# Patient Record
Sex: MALE | Race: WHITE | NOT HISPANIC OR LATINO | URBAN - METROPOLITAN AREA
[De-identification: names, ages, dates, MRNs, and addresses within clinical notes are randomized per-mention and may not be internally consistent; named-entity substitution may affect disease eponyms.]

---

## 2017-01-16 ENCOUNTER — OUTPATIENT (OUTPATIENT)
Dept: OUTPATIENT SERVICES | Facility: HOSPITAL | Age: 62
LOS: 1 days | End: 2017-01-16
Payer: COMMERCIAL

## 2017-01-16 DIAGNOSIS — I72.8 ANEURYSM OF OTHER SPECIFIED ARTERIES: Chronic | ICD-10-CM

## 2017-01-16 PROCEDURE — 71250 CT THORAX DX C-: CPT

## 2017-01-16 PROCEDURE — 71250 CT THORAX DX C-: CPT | Mod: 26

## 2017-01-16 PROCEDURE — 76376 3D RENDER W/INTRP POSTPROCES: CPT | Mod: 26

## 2017-01-19 ENCOUNTER — OUTPATIENT (OUTPATIENT)
Dept: OUTPATIENT SERVICES | Facility: HOSPITAL | Age: 62
LOS: 1 days | End: 2017-01-19
Payer: COMMERCIAL

## 2017-01-19 DIAGNOSIS — Z01.818 ENCOUNTER FOR OTHER PREPROCEDURAL EXAMINATION: ICD-10-CM

## 2017-01-19 DIAGNOSIS — I72.8 ANEURYSM OF OTHER SPECIFIED ARTERIES: Chronic | ICD-10-CM

## 2017-01-19 LAB
ALBUMIN SERPL ELPH-MCNC: 4 G/DL — SIGNIFICANT CHANGE UP (ref 3.4–5)
ALP SERPL-CCNC: 71 U/L — SIGNIFICANT CHANGE UP (ref 40–120)
ALT FLD-CCNC: 25 U/L — SIGNIFICANT CHANGE UP (ref 12–42)
ANION GAP SERPL CALC-SCNC: 9 MMOL/L — SIGNIFICANT CHANGE UP (ref 9–16)
APTT BLD: 33.2 SEC — SIGNIFICANT CHANGE UP (ref 27.5–37.4)
AST SERPL-CCNC: 17 U/L — SIGNIFICANT CHANGE UP (ref 15–37)
BILIRUB SERPL-MCNC: 0.7 MG/DL — SIGNIFICANT CHANGE UP (ref 0.2–1.2)
BUN SERPL-MCNC: 21 MG/DL — SIGNIFICANT CHANGE UP (ref 7–23)
CALCIUM SERPL-MCNC: 8.4 MG/DL — LOW (ref 8.5–10.5)
CHLORIDE SERPL-SCNC: 104 MMOL/L — SIGNIFICANT CHANGE UP (ref 96–108)
CO2 SERPL-SCNC: 29 MMOL/L — SIGNIFICANT CHANGE UP (ref 22–31)
CREAT SERPL-MCNC: 0.86 MG/DL — SIGNIFICANT CHANGE UP (ref 0.5–1.3)
GLUCOSE SERPL-MCNC: 83 MG/DL — SIGNIFICANT CHANGE UP (ref 70–99)
HCT VFR BLD CALC: 41.7 % — SIGNIFICANT CHANGE UP (ref 39–50)
HGB BLD-MCNC: 14.4 G/DL — SIGNIFICANT CHANGE UP (ref 13–17)
INR BLD: 1.06 — SIGNIFICANT CHANGE UP (ref 0.88–1.16)
MCHC RBC-ENTMCNC: 32 PG — SIGNIFICANT CHANGE UP (ref 27–34)
MCHC RBC-ENTMCNC: 34.5 G/DL — SIGNIFICANT CHANGE UP (ref 32–36)
MCV RBC AUTO: 92.7 FL — SIGNIFICANT CHANGE UP (ref 80–100)
PLATELET # BLD AUTO: 198 K/UL — SIGNIFICANT CHANGE UP (ref 150–400)
POTASSIUM SERPL-MCNC: 4.2 MMOL/L — SIGNIFICANT CHANGE UP (ref 3.5–5.3)
POTASSIUM SERPL-SCNC: 4.2 MMOL/L — SIGNIFICANT CHANGE UP (ref 3.5–5.3)
PROT SERPL-MCNC: 7.1 G/DL — SIGNIFICANT CHANGE UP (ref 6.4–8.2)
PROTHROM AB SERPL-ACNC: 11.8 SEC — SIGNIFICANT CHANGE UP (ref 10–13.1)
RBC # BLD: 4.5 M/UL — SIGNIFICANT CHANGE UP (ref 4.2–5.8)
RBC # FLD: 12.1 % — SIGNIFICANT CHANGE UP (ref 10.3–16.9)
SODIUM SERPL-SCNC: 142 MMOL/L — SIGNIFICANT CHANGE UP (ref 135–145)
WBC # BLD: 3.8 K/UL — SIGNIFICANT CHANGE UP (ref 3.8–10.5)
WBC # FLD AUTO: 3.8 K/UL — SIGNIFICANT CHANGE UP (ref 3.8–10.5)

## 2017-01-19 PROCEDURE — G0103: CPT

## 2017-01-19 PROCEDURE — 85730 THROMBOPLASTIN TIME PARTIAL: CPT

## 2017-01-19 PROCEDURE — 93010 ELECTROCARDIOGRAM REPORT: CPT

## 2017-01-19 PROCEDURE — 93005 ELECTROCARDIOGRAM TRACING: CPT

## 2017-01-19 PROCEDURE — 85610 PROTHROMBIN TIME: CPT

## 2017-01-19 PROCEDURE — 85027 COMPLETE CBC AUTOMATED: CPT

## 2017-01-19 PROCEDURE — 80053 COMPREHEN METABOLIC PANEL: CPT

## 2017-01-20 LAB — PSA FLD-MCNC: 4.04 NG/ML — HIGH (ref 0–4)

## 2017-01-24 ENCOUNTER — RESULT REVIEW (OUTPATIENT)
Age: 62
End: 2017-01-24

## 2017-01-25 ENCOUNTER — OUTPATIENT (OUTPATIENT)
Dept: OUTPATIENT SERVICES | Facility: HOSPITAL | Age: 62
LOS: 1 days | Discharge: HOME | End: 2017-01-25
Payer: COMMERCIAL

## 2017-01-25 DIAGNOSIS — I72.8 ANEURYSM OF OTHER SPECIFIED ARTERIES: Chronic | ICD-10-CM

## 2017-01-25 PROCEDURE — 87305 ASPERGILLUS AG IA: CPT

## 2017-01-25 PROCEDURE — 87206 SMEAR FLUORESCENT/ACID STAI: CPT

## 2017-01-25 PROCEDURE — 87116 MYCOBACTERIA CULTURE: CPT

## 2017-01-25 PROCEDURE — 88112 CYTOPATH CELL ENHANCE TECH: CPT

## 2017-01-25 PROCEDURE — 87070 CULTURE OTHR SPECIMN AEROBIC: CPT

## 2017-01-25 PROCEDURE — 31624 DX BRONCHOSCOPE/LAVAGE: CPT | Mod: RT

## 2017-01-25 PROCEDURE — 87102 FUNGUS ISOLATION CULTURE: CPT

## 2017-01-25 PROCEDURE — 87015 SPECIMEN INFECT AGNT CONCNTJ: CPT

## 2017-01-25 PROCEDURE — 88305 TISSUE EXAM BY PATHOLOGIST: CPT

## 2017-01-26 LAB
GRAM STN FLD: SIGNIFICANT CHANGE UP
NIGHT BLUE STAIN TISS: SIGNIFICANT CHANGE UP
SPECIMEN SOURCE: SIGNIFICANT CHANGE UP
SPECIMEN SOURCE: SIGNIFICANT CHANGE UP

## 2017-01-27 LAB
GALACTOMANNAN AG SPEC IA-ACNC: <0.5 INDEX — SIGNIFICANT CHANGE UP
NON-GYNECOLOGICAL CYTOLOGY STUDY: SIGNIFICANT CHANGE UP

## 2017-01-28 DIAGNOSIS — J21.9 ACUTE BRONCHIOLITIS, UNSPECIFIED: ICD-10-CM

## 2017-01-28 DIAGNOSIS — J47.9 BRONCHIECTASIS, UNCOMPLICATED: ICD-10-CM

## 2017-01-28 DIAGNOSIS — N40.0 BENIGN PROSTATIC HYPERPLASIA WITHOUT LOWER URINARY TRACT SYMPTOMS: ICD-10-CM

## 2017-01-28 LAB
CULTURE RESULTS: SIGNIFICANT CHANGE UP
SPECIMEN SOURCE: SIGNIFICANT CHANGE UP

## 2017-02-25 LAB
CULTURE RESULTS: SIGNIFICANT CHANGE UP
SPECIMEN SOURCE: SIGNIFICANT CHANGE UP

## 2017-03-11 LAB
CULTURE RESULTS: SIGNIFICANT CHANGE UP
SPECIMEN SOURCE: SIGNIFICANT CHANGE UP

## 2017-04-03 ENCOUNTER — APPOINTMENT (OUTPATIENT)
Dept: VASCULAR SURGERY | Facility: CLINIC | Age: 62
End: 2017-04-03

## 2017-04-03 VITALS
BODY MASS INDEX: 24.49 KG/M2 | OXYGEN SATURATION: 97 % | SYSTOLIC BLOOD PRESSURE: 128 MMHG | DIASTOLIC BLOOD PRESSURE: 86 MMHG | HEIGHT: 65 IN | WEIGHT: 147 LBS | HEART RATE: 78 BPM

## 2017-04-24 ENCOUNTER — APPOINTMENT (OUTPATIENT)
Dept: VASCULAR SURGERY | Facility: CLINIC | Age: 62
End: 2017-04-24

## 2017-04-28 ENCOUNTER — APPOINTMENT (OUTPATIENT)
Dept: VASCULAR SURGERY | Facility: CLINIC | Age: 62
End: 2017-04-28

## 2017-05-12 ENCOUNTER — APPOINTMENT (OUTPATIENT)
Dept: VASCULAR SURGERY | Facility: CLINIC | Age: 62
End: 2017-05-12

## 2017-08-23 ENCOUNTER — LABORATORY RESULT (OUTPATIENT)
Age: 62
End: 2017-08-23

## 2017-08-23 ENCOUNTER — APPOINTMENT (OUTPATIENT)
Dept: RHEUMATOLOGY | Facility: CLINIC | Age: 62
End: 2017-08-23
Payer: COMMERCIAL

## 2017-08-23 VITALS
RESPIRATION RATE: 17 BRPM | HEIGHT: 65 IN | BODY MASS INDEX: 24.49 KG/M2 | HEART RATE: 75 BPM | SYSTOLIC BLOOD PRESSURE: 117 MMHG | OXYGEN SATURATION: 98 % | TEMPERATURE: 98.1 F | DIASTOLIC BLOOD PRESSURE: 78 MMHG | WEIGHT: 147 LBS

## 2017-08-23 DIAGNOSIS — M79.606 PAIN IN LEG, UNSPECIFIED: ICD-10-CM

## 2017-08-23 DIAGNOSIS — I83.93 ASYMPTOMATIC VARICOSE VEINS OF BILATERAL LOWER EXTREMITIES: ICD-10-CM

## 2017-08-23 PROCEDURE — 99214 OFFICE O/P EST MOD 30 MIN: CPT | Mod: 25

## 2017-08-23 PROCEDURE — 36415 COLL VENOUS BLD VENIPUNCTURE: CPT

## 2017-08-24 LAB
ALBUMIN SERPL ELPH-MCNC: 4.6 G/DL
ALP BLD-CCNC: 58 U/L
ALT SERPL-CCNC: 17 U/L
ANION GAP SERPL CALC-SCNC: 17 MMOL/L
AST SERPL-CCNC: 28 U/L
BASOPHILS # BLD AUTO: 0.01 K/UL
BASOPHILS NFR BLD AUTO: 0.2 %
BILIRUB SERPL-MCNC: 1.1 MG/DL
BUN SERPL-MCNC: 24 MG/DL
C3 SERPL-MCNC: 97 MG/DL
C4 SERPL-MCNC: 21 MG/DL
CALCIUM SERPL-MCNC: 9.7 MG/DL
CHLORIDE SERPL-SCNC: 99 MMOL/L
CHOLEST SERPL-MCNC: 231 MG/DL
CHOLEST/HDLC SERPL: 3 RATIO
CK SERPL-CCNC: 89 U/L
CO2 SERPL-SCNC: 26 MMOL/L
CREAT SERPL-MCNC: 0.92 MG/DL
CRP SERPL-MCNC: <0.2 MG/DL
EOSINOPHIL # BLD AUTO: 0.18 K/UL
EOSINOPHIL NFR BLD AUTO: 4.3 %
ERYTHROCYTE [SEDIMENTATION RATE] IN BLOOD BY WESTERGREN METHOD: 12 MM/HR
GLUCOSE SERPL-MCNC: 87 MG/DL
HCT VFR BLD CALC: 44.6 %
HDLC SERPL-MCNC: 77 MG/DL
HGB BLD-MCNC: 14.4 G/DL
IMM GRANULOCYTES NFR BLD AUTO: 0 %
IRON SATN MFR SERPL: 39 %
IRON SERPL-MCNC: 135 UG/DL
LDLC SERPL CALC-MCNC: 138 MG/DL
LYMPHOCYTES # BLD AUTO: 1.8 K/UL
LYMPHOCYTES NFR BLD AUTO: 42.9 %
MAN DIFF?: NORMAL
MCHC RBC-ENTMCNC: 31.2 PG
MCHC RBC-ENTMCNC: 32.3 GM/DL
MCV RBC AUTO: 96.5 FL
MONOCYTES # BLD AUTO: 0.36 K/UL
MONOCYTES NFR BLD AUTO: 8.6 %
NEUTROPHILS # BLD AUTO: 1.85 K/UL
NEUTROPHILS NFR BLD AUTO: 44 %
PLATELET # BLD AUTO: 237 K/UL
POTASSIUM SERPL-SCNC: 4.7 MMOL/L
PROT SERPL-MCNC: 7.7 G/DL
RBC # BLD: 4.62 M/UL
RBC # FLD: 13.3 %
RHEUMATOID FACT SER QL: <7 IU/ML
SODIUM SERPL-SCNC: 142 MMOL/L
T3FREE SERPL-MCNC: 2.82 PG/ML
T4 FREE SERPL-MCNC: 1.2 NG/DL
T4 SERPL-MCNC: 6.7 UG/DL
TIBC SERPL-MCNC: 344 UG/DL
TRIGL SERPL-MCNC: 80 MG/DL
TSH SERPL-ACNC: 1.7 UIU/ML
UIBC SERPL-MCNC: 209 UG/DL
URATE SERPL-MCNC: 4.4 MG/DL
WBC # FLD AUTO: 4.2 K/UL

## 2017-08-29 LAB
ANA SER IF-ACNC: NEGATIVE
CCP AB SER IA-ACNC: <8 UNITS
CH50 SERPL-MCNC: 58 U/ML
IGG SUBSET TOTAL IGG: 1030 MG/DL
IGG1 SER-MCNC: 481 MG/DL
IGG2 SER-MCNC: 446 MG/DL
IGG3 SER-MCNC: 85.3 MG/DL
IGG4 SER-MCNC: 82 MG/DL
RF+CCP IGG SER-IMP: NEGATIVE

## 2017-10-18 ENCOUNTER — APPOINTMENT (OUTPATIENT)
Dept: RHEUMATOLOGY | Facility: CLINIC | Age: 62
End: 2017-10-18
Payer: COMMERCIAL

## 2017-10-18 VITALS
HEART RATE: 70 BPM | DIASTOLIC BLOOD PRESSURE: 79 MMHG | RESPIRATION RATE: 17 BRPM | BODY MASS INDEX: 24.16 KG/M2 | HEIGHT: 65 IN | TEMPERATURE: 98.3 F | OXYGEN SATURATION: 98 % | SYSTOLIC BLOOD PRESSURE: 118 MMHG | WEIGHT: 145 LBS

## 2017-10-18 DIAGNOSIS — M25.50 PAIN IN UNSPECIFIED JOINT: ICD-10-CM

## 2017-10-18 PROCEDURE — 99214 OFFICE O/P EST MOD 30 MIN: CPT

## 2018-05-17 ENCOUNTER — OUTPATIENT (OUTPATIENT)
Dept: OUTPATIENT SERVICES | Facility: HOSPITAL | Age: 63
LOS: 1 days | Discharge: HOME | End: 2018-05-17

## 2018-05-17 ENCOUNTER — RESULT REVIEW (OUTPATIENT)
Age: 63
End: 2018-05-17

## 2018-05-17 DIAGNOSIS — I72.8 ANEURYSM OF OTHER SPECIFIED ARTERIES: Chronic | ICD-10-CM

## 2018-05-23 LAB — SURGICAL PATHOLOGY STUDY: SIGNIFICANT CHANGE UP

## 2018-11-15 ENCOUNTER — EMERGENCY (EMERGENCY)
Facility: HOSPITAL | Age: 63
LOS: 1 days | Discharge: HOME | End: 2018-11-15
Admitting: EMERGENCY MEDICINE
Payer: COMMERCIAL

## 2018-11-15 VITALS
SYSTOLIC BLOOD PRESSURE: 121 MMHG | RESPIRATION RATE: 17 BRPM | HEART RATE: 82 BPM | TEMPERATURE: 98 F | DIASTOLIC BLOOD PRESSURE: 81 MMHG | OXYGEN SATURATION: 97 %

## 2018-11-15 VITALS
RESPIRATION RATE: 18 BRPM | DIASTOLIC BLOOD PRESSURE: 79 MMHG | OXYGEN SATURATION: 100 % | TEMPERATURE: 98 F | HEART RATE: 57 BPM | SYSTOLIC BLOOD PRESSURE: 122 MMHG
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
## 2018-11-15 DIAGNOSIS — Z79.1 LONG TERM (CURRENT) USE OF NON-STEROIDAL ANTI-INFLAMMATORIES (NSAID): ICD-10-CM

## 2018-11-15 DIAGNOSIS — Z79.82 LONG TERM (CURRENT) USE OF ASPIRIN: ICD-10-CM

## 2018-11-15 DIAGNOSIS — I72.8 ANEURYSM OF OTHER SPECIFIED ARTERIES: Chronic | ICD-10-CM
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
## 2018-11-15 DIAGNOSIS — S39.012A STRAIN OF MUSCLE, FASCIA AND TENDON OF LOWER BACK, INITIAL ENCOUNTER: ICD-10-CM

## 2018-11-15 DIAGNOSIS — Z79.899 OTHER LONG TERM (CURRENT) DRUG THERAPY: ICD-10-CM

## 2018-11-15 DIAGNOSIS — M54.5 LOW BACK PAIN: ICD-10-CM

## 2018-11-15 PROCEDURE — 96372 THER/PROPH/DIAG INJ SC/IM: CPT

## 2018-11-15 PROCEDURE — 99283 EMERGENCY DEPT VISIT LOW MDM: CPT | Mod: 25

## 2018-11-15 RX ORDER — KETOROLAC TROMETHAMINE 30 MG/ML
30 SYRINGE (ML) INJECTION ONCE
Qty: 0 | Refills: 0 | Status: DISCONTINUED | OUTPATIENT
Start: 2018-11-15 | End: 2018-11-15

## 2018-11-15 RX ORDER — METHOCARBAMOL 500 MG/1
1 TABLET, FILM COATED ORAL
Qty: 10 | Refills: 0 | OUTPATIENT
Start: 2018-11-15 | End: 2018-11-19

## 2018-11-15 RX ADMIN — Medication 30 MILLIGRAM(S): at 09:08

## 2018-11-15 NOTE — ED PROVIDER NOTE - MEDICAL DECISION MAKING DETAILS
Patient with reproducible back pain on exam. Recommend rest, ice, heat therapy and NSAIDs PRN. F/u with pmd.

## 2018-11-15 NOTE — ED ADULT TRIAGE NOTE - CHIEF COMPLAINT QUOTE
Pt states "I hurt my back this am lifting a garden pot filled with soil. My lower left side hurts." Numbness/tingling in legs. Pt able to ambulate without difficulty

## 2018-11-15 NOTE — ED ADULT NURSE NOTE - OBJECTIVE STATEMENT
Pt presents complaining of left sided lower back pain that started today after lifting a package of soil at 3am today. Pt states "The pain is on my left side. I took Advil at 4am but I still have the pain, especially when I walk". Pt denies fevers, no lightheadedness, no dizziness, no headache, no cp, no sob, no n/v, no changes to bowels, no urinary complaints, no bowel or bladder incontinence, no numbness or tingling. +PP +CSM.

## 2018-11-15 NOTE — ED ADULT NURSE NOTE - CHPI ED NUR SYMPTOMS NEG
no numbness/no motor function loss/no bladder dysfunction/no constipation/no neck tenderness/no tingling/no bowel dysfunction/no difficulty bearing weight/no anorexia/no fatigue

## 2018-11-15 NOTE — ED PROVIDER NOTE - PMH
Abnormal CT scan of lung  CT Chest 5/15/15 @[facility]: Mild small and large airways disease which may be secondary to nontuberculous mycobacterial infection in the appropriate clinical setting. 2.  Trace pericardial effusion.  Artery aneurysm, upper extremity  unknown type of residual disease of Left upper extremity artery (pt stated not repairable)  BPH (benign prostatic hypertrophy)    Cholelithiasis    Vertigo

## 2018-11-15 NOTE — ED ADULT NURSE NOTE - FAMILY HISTORY
Father  Still living? No  Family history of acute myocardial infarction, Age at diagnosis: 71-80     Mother  Still living? Yes, Estimated age: 81-90  Family history of hypertension in mother, Age at diagnosis: Age Unknown

## 2018-11-15 NOTE — ED ADULT NURSE NOTE - PMH
Abnormal CT scan of lung  CT Chest 5/15/15 @[facility]: Mild small and large airways disease which may be secondary to nontuberculous mycobacterial infection in the appropriate clinical setting. 2.  Trace pericardial effusion.  Artery aneurysm, upper extremity  unknown type of residual disease of Left upper extremity artery (pt stated not repairable)  BPH (benign prostatic hypertrophy)    Cholelithiasis    Vertigo

## 2018-11-15 NOTE — ED PROVIDER NOTE - OBJECTIVE STATEMENT
64 y/o m with h/o vertigo, BPH, presents to ED c/o lower back pain after lifting a heavy plant this morning. He report pain worsen with movement and better at rest. No h/o chronic back pain, paresis, paresthesia, abd pain, n, v, fever, hematuria. No incontinence.

## 2018-11-15 NOTE — ED PROVIDER NOTE - CHPI ED SYMPTOMS NEG
no numbness/no motor function loss/no difficulty bearing weight/no tingling/no bowel dysfunction/no bladder dysfunction

## 2018-11-15 NOTE — ED PROVIDER NOTE - PELVIS
+ reproducible pain at paraspinal lumbar tenderness, no midline pain, no paresis, paresthesia, ambulating well./stable

## 2019-03-04 ENCOUNTER — APPOINTMENT (OUTPATIENT)
Dept: SURGERY | Facility: CLINIC | Age: 64
End: 2019-03-04
Payer: COMMERCIAL

## 2019-03-04 VITALS
BODY MASS INDEX: 25.62 KG/M2 | WEIGHT: 153.8 LBS | HEIGHT: 65 IN | SYSTOLIC BLOOD PRESSURE: 115 MMHG | DIASTOLIC BLOOD PRESSURE: 74 MMHG | OXYGEN SATURATION: 98 % | HEART RATE: 74 BPM

## 2019-03-04 PROCEDURE — 99244 OFF/OP CNSLTJ NEW/EST MOD 40: CPT

## 2019-04-01 ENCOUNTER — APPOINTMENT (OUTPATIENT)
Dept: VASCULAR SURGERY | Facility: CLINIC | Age: 64
End: 2019-04-01
Payer: COMMERCIAL

## 2019-04-01 DIAGNOSIS — I83.10 VARICOSE VEINS OF UNSPECIFIED LOWER EXTREMITY WITH INFLAMMATION: ICD-10-CM

## 2019-04-01 PROCEDURE — 93971 EXTREMITY STUDY: CPT

## 2019-04-01 PROCEDURE — 99214 OFFICE O/P EST MOD 30 MIN: CPT

## 2019-04-01 NOTE — HISTORY OF PRESENT ILLNESS
[FreeTextEntry1] : 62 yo M, known to the practice who has a long standing history of varicose veins, s/p L GSV RFA in 2013 followed by R LSV RFA 4/24/17 who comes today with a c/o ankle edema and new bulging varicose veins of his left leg that became very prominent and tender. Patient wears compression stockings most of the days but he noticed ankle swelling that he didn't have before. He denies claudication, skin breakdown. He would like to know if anything can be done to reduce the swelling.\par

## 2019-04-01 NOTE — ASSESSMENT
[FreeTextEntry1] : 62 yo M, known to the practice who has a long standing history of varicose veins, s/p L GSV RFA in 2013 followed by R LSV RFA 4/24/17 who comes today with a c/o ankle edema and new bulging varicose veins of his left leg that became very prominent and tender.\par GSV is closed, + tributaries over thigh and calf by venous doppler today.\par Large, tender varicosities over thigh and calf.\par Patient was recommended to have Stab phlebectomy to help with his symptoms and to reduce ankle edema.\par He would like to think about it and let us know about his decision.\par Pt will call the office once he decides to do a procedure.

## 2019-04-01 NOTE — PHYSICAL EXAM
[2+] : left 2+ [Ankle Swelling (On Exam)] : present [Ankle Swelling On The Left] : of the left ankle [Ankle Swelling On The Right] : mild [Varicose Veins Of Lower Extremities] : present [Varicose Veins Of The Left Leg] : of the left leg [Ankle Swelling Bilaterally] : severe [] : not present [Abdomen Tenderness] : ~T ~M No abdominal tenderness [No Rash or Lesion] : No rash or lesion [Alert] : alert [Calm] : calm [de-identified] : WN/WD, NAD [de-identified] : NC/AT [de-identified] : LLE: large, bulging varicosities over medial thigh and calf

## 2019-04-01 NOTE — REVIEW OF SYSTEMS
[As noted in HPI] : as noted in HPI [Leg Claudication] : no intermittent leg claudication [Lower Ext Edema] : lower extremity edema [As Noted in HPI] : as noted in HPI [Limb Pain] : limb pain [Limb Swelling] : limb swelling [Negative] : Heme/Lymph

## 2019-04-30 ENCOUNTER — APPOINTMENT (OUTPATIENT)
Dept: RHEUMATOLOGY | Facility: CLINIC | Age: 64
End: 2019-04-30
Payer: COMMERCIAL

## 2019-04-30 VITALS
BODY MASS INDEX: 24.32 KG/M2 | OXYGEN SATURATION: 98 % | HEART RATE: 68 BPM | HEIGHT: 65 IN | DIASTOLIC BLOOD PRESSURE: 80 MMHG | TEMPERATURE: 98.6 F | WEIGHT: 146 LBS | SYSTOLIC BLOOD PRESSURE: 114 MMHG

## 2019-04-30 DIAGNOSIS — M19.042 PRIMARY OSTEOARTHRITIS, RIGHT HAND: ICD-10-CM

## 2019-04-30 DIAGNOSIS — M19.041 PRIMARY OSTEOARTHRITIS, RIGHT HAND: ICD-10-CM

## 2019-04-30 PROCEDURE — 99214 OFFICE O/P EST MOD 30 MIN: CPT

## 2019-04-30 NOTE — PHYSICAL EXAM
[General Appearance - Alert] : alert [General Appearance - In No Acute Distress] : in no acute distress [Outer Ear] : the ears and nose were normal in appearance [Oropharynx] : the oropharynx was normal [Abnormal Walk] : normal gait [Nail Clubbing] : no clubbing  or cyanosis of the fingernails [Musculoskeletal - Swelling] : no joint swelling seen [Motor Tone] : muscle strength and tone were normal [Oriented To Time, Place, And Person] : oriented to person, place, and time [Impaired Insight] : insight and judgment were intact [Affect] : the affect was normal

## 2019-04-30 NOTE — HISTORY OF PRESENT ILLNESS
[FreeTextEntry1] : OA. Knees are OK today. Feels Glucos/Chondroi not helping as much in base of thumbs, but is helping in knees. \par \par Has pains MTPs with walking, on plantar surface of all toes both feet.

## 2019-04-30 NOTE — ASSESSMENT
[FreeTextEntry1] : OA knees, base thumbs\par Metatarsalgia\par \par PLAN\par \par Continue Gluc/Chond\par Refer to Dr. Paris\par See 6 mos.

## 2019-05-04 NOTE — HISTORY OF PRESENT ILLNESS
[de-identified] : Mr. Keller presented today for evaluation and management of a left inguinal hernia.  He stated the hernia has been present for approximately 2 years.  He noted a bulge in the left groin.  He denied pain of the hernia.  He stated lifting makes the hernia bulge more.

## 2019-05-04 NOTE — CONSULT LETTER
[FreeTextEntry1] : 2019\par \par \par Paulie Talavera M.D.\par [address] [facility] [facility], [address]\[place] [address]\par Telephone #:  [phone]\par \par \par Re:  Russell Keller\par :  1955\par \par Dear Dr. Talavera:\par \par I had the opportunity to see Mr. Keller today for evaluation and management of a left inguinal hernia.  He stated the hernia has been present for approximately 2 years.  He noted a bulge in the left groin.  He denied pain of the hernia.  He stated lifting makes the hernia bulge more.\par \par As you are aware, he has a past medical history of BPH, CAD, hypertension, hyperlipidemia, gallstones, and arthritis.  He has a past surgical history of a sinus surgery (2018), wrist surgery, retinal detachment repair, and cataract surgery.\par \par His medications include finasteride, tamsulosin, aspirin, atorvastatin, and Cosamin.  He has no known allergies.\par \par A ten-point review of symptoms was positive for nosebleeds, leg pain with walking, constipation, and joint pain.\par \par He has a family history of prostate cancer in his father, myocardial infarction in his father, and hypertension in his mother\par \par His social history is significant for caffeine use, former tobacco use, and social alcohol use.  He denied current tobacco use or drug use.  \par \par On physical examination, his height is 5 feet 5 inches, his weight is 153 pounds, and BMI is 25.59.  His blood pressure is 115/74, heart rate is 74, and O2 saturation is 98% on room air.  In general, he is a well-dressed, well-nourished man who appears his stated age and is in no acute distress.  He is calm, alert and oriented x 3.  HEENT exam demonstrates no scleral icterus and a normocephalic atraumatic appearance.  Neck is supple, with no JVD or cervical lymphadenopathy appreciated.  Lungs are clear to auscultation bilaterally.  Heart sounds S1S2 are normal with a regular rate and rhythm.  His abdomen has audible bowel sounds, is soft, non-tender, and non-distended.  There is no hepatosplenomegaly.  There is a small reducible umbilical hernia appreciated.  His extremities are warm and dry with no evidence of clubbing, cyanosis, or edema.  Bilateral groin examination demonstrates bilateral reducible, non-tender inguinal hernias, left greater than right.\par \par In summary, Mr. Keller is a 63-year-old man with a bilateral inguinal hernias and an umbilical hernia.  As he is currently asymptomatic, he wishes to avoid surgery at this time.  We will plan for a robotic bilateral inguinal hernia repair with mesh and umbilical hernia repair at the patient’s convenience, if he wishes to proceed with surgery.\par \par Please do not hesitate to contact me in the event that you have any questions or concerns about the care of this patient.\par \par Sincerely,\par \par \par \par \par Jackelyn Fraser M.D.\par \par CC:\par Saritha Schilling M.D.\par [facility]\par  [address] [place] [address], [address]\[place]  [address]\par Telephone #: [phone]\par \par Raymond Blanchard M.D.\par [address], [address]\[place]  [address]\par Telephone #: [phone]

## 2019-05-04 NOTE — REVIEW OF SYSTEMS
[Nosebleeds] : nosebleeds [Leg Claudication] : intermittent leg claudication [Constipation] : constipation [Joint Pain] : joint pain [Negative] : Heme/Lymph [Earache] : no earache [Loss Of Hearing] : no hearing loss [Hoarseness] : no hoarseness [Sore Throat] : no sore throat [Nasal Discharge] : no nasal discharge [Heart Rate Is Slow] : the heart rate was not slow [Palpitations] : no palpitations [Chest Pain] : no chest pain [Heart Rate Is Fast] : the heart rate was not fast [Lower Ext Edema] : no extremity edema [Abdominal Pain] : no abdominal pain [Vomiting] : no vomiting [Heartburn] : no heartburn [Diarrhea] : no diarrhea [Melena] : no melena [Arthralgias] : no arthralgias [Joint Stiffness] : no joint stiffness [Joint Swelling] : no joint swelling [Limb Swelling] : no limb swelling [Limb Pain] : no limb pain

## 2019-05-04 NOTE — PHYSICAL EXAM
[Calm] : calm [de-identified] : NCAT, no scleral icterus [de-identified] : NAD, comfortable [de-identified] : Supple, no JVD or cervical lymphadenopathy [de-identified] : Bilateral reducible inguinal hernias, L > R. [de-identified] : +BS soft NT ND.  No hepatosplenomegaly.  Small umbilical hernia, reducible and non-tender. [de-identified] : S1S2 normal, RRR [de-identified] : CTAB [de-identified] : No clubbing, cyanosis, or edema. [de-identified] : Warm, dry. [de-identified] : A&Ox3

## 2019-07-05 ENCOUNTER — EMERGENCY (EMERGENCY)
Facility: HOSPITAL | Age: 64
LOS: 1 days | Discharge: HOME | End: 2019-07-05
Attending: EMERGENCY MEDICINE | Admitting: EMERGENCY MEDICINE
Payer: COMMERCIAL

## 2019-07-05 VITALS
SYSTOLIC BLOOD PRESSURE: 140 MMHG | TEMPERATURE: 98 F | OXYGEN SATURATION: 98 % | DIASTOLIC BLOOD PRESSURE: 81 MMHG | RESPIRATION RATE: 16 BRPM | HEART RATE: 82 BPM

## 2019-07-05 VITALS
SYSTOLIC BLOOD PRESSURE: 128 MMHG | TEMPERATURE: 98 F | RESPIRATION RATE: 20 BRPM | WEIGHT: 141.1 LBS | HEART RATE: 97 BPM | DIASTOLIC BLOOD PRESSURE: 89 MMHG | HEIGHT: 65 IN | OXYGEN SATURATION: 97 %

## 2019-07-05 DIAGNOSIS — M54.9 DORSALGIA, UNSPECIFIED: ICD-10-CM

## 2019-07-05 DIAGNOSIS — R07.89 OTHER CHEST PAIN: ICD-10-CM

## 2019-07-05 DIAGNOSIS — I72.8 ANEURYSM OF OTHER SPECIFIED ARTERIES: Chronic | ICD-10-CM

## 2019-07-05 LAB — TROPONIN T SERPL-MCNC: <0.01 NG/ML — SIGNIFICANT CHANGE UP (ref 0–0.01)

## 2019-07-05 PROCEDURE — 84484 ASSAY OF TROPONIN QUANT: CPT

## 2019-07-05 PROCEDURE — 71275 CT ANGIOGRAPHY CHEST: CPT | Mod: 26

## 2019-07-05 PROCEDURE — 99284 EMERGENCY DEPT VISIT MOD MDM: CPT

## 2019-07-05 PROCEDURE — 85730 THROMBOPLASTIN TIME PARTIAL: CPT

## 2019-07-05 PROCEDURE — 74174 CTA ABD&PLVS W/CONTRAST: CPT

## 2019-07-05 PROCEDURE — 82553 CREATINE MB FRACTION: CPT

## 2019-07-05 PROCEDURE — 85610 PROTHROMBIN TIME: CPT

## 2019-07-05 PROCEDURE — 36415 COLL VENOUS BLD VENIPUNCTURE: CPT

## 2019-07-05 PROCEDURE — 85025 COMPLETE CBC W/AUTO DIFF WBC: CPT

## 2019-07-05 PROCEDURE — 74174 CTA ABD&PLVS W/CONTRAST: CPT | Mod: 26

## 2019-07-05 PROCEDURE — 80053 COMPREHEN METABOLIC PANEL: CPT

## 2019-07-05 PROCEDURE — 71275 CT ANGIOGRAPHY CHEST: CPT

## 2019-07-05 PROCEDURE — 82550 ASSAY OF CK (CPK): CPT

## 2019-07-05 RX ORDER — IBUPROFEN 200 MG
600 TABLET ORAL ONCE
Refills: 0 | Status: COMPLETED | OUTPATIENT
Start: 2019-07-05 | End: 2019-07-05

## 2019-07-05 RX ADMIN — Medication 600 MILLIGRAM(S): at 11:36

## 2019-07-05 NOTE — ED PROVIDER NOTE - OBJECTIVE STATEMENT
past few days int dull chest pain, last night pain was going to back.  no sob  2 years ago had chest pain and had a cardiac cath, there was some blockage but not enough to stent  sees his cardiologist regularly

## 2019-07-05 NOTE — ED ADULT NURSE NOTE - PMH
Abnormal CT scan of lung  CT Chest 5/15/15 @[facility]: Mild small and large airways disease which may be secondary to nontuberculous mycobacterial infection in the appropriate clinical setting. 2.  Trace pericardial effusion.  Artery aneurysm, upper extremity  unknown type of residual disease of Left upper extremity artery (pt stated not repairable)  BPH (benign prostatic hypertrophy)    Cholelithiasis    Vertigo

## 2019-07-05 NOTE — ED PROVIDER NOTE - PMH
Abnormal CT scan of lung  CT Chest 5/15/15 @[facility]: Mild small and large airways disease which may be secondary to nontuberculous mycobacterial infection in the appropriate clinical setting. 2.  Trace pericardial effusion.  Artery aneurysm, upper extremity  unknown type of residual disease of Left upper extremity artery (pt stated not repairable)  BPH (benign prostatic hypertrophy)    Cholelithiasis    Vertigo

## 2019-07-05 NOTE — ED PROVIDER NOTE - CLINICAL SUMMARY MEDICAL DECISION MAKING FREE TEXT BOX
63 yo male with cp goes to back.  ekg normal.  hx of cardiac cath 2 years ago no occlusive disease.  bp and hr normal but because of pain going towards back will get a ct chest/abd ro dissection.  pt appears comfortable, no distress

## 2019-09-30 ENCOUNTER — APPOINTMENT (OUTPATIENT)
Dept: SURGERY | Facility: CLINIC | Age: 64
End: 2019-09-30
Payer: COMMERCIAL

## 2019-09-30 VITALS
WEIGHT: 149.38 LBS | DIASTOLIC BLOOD PRESSURE: 81 MMHG | HEIGHT: 65 IN | OXYGEN SATURATION: 98 % | TEMPERATURE: 97.7 F | BODY MASS INDEX: 24.89 KG/M2 | SYSTOLIC BLOOD PRESSURE: 126 MMHG | HEART RATE: 72 BPM

## 2019-09-30 PROCEDURE — 99213 OFFICE O/P EST LOW 20 MIN: CPT

## 2019-09-30 NOTE — HISTORY OF PRESENT ILLNESS
[de-identified] : He presented today for a follow up visit.  He believes the left inguinal hernia has enlarged.  He denied significant pain, although he intermittently has burning pain in the right groin that radiates down the front of his right leg. [de-identified] : Mr. Keller presented previously for evaluation and management of a left inguinal hernia.  He stated the hernia has been present for approximately 2 years.  He noted a bulge in the left groin.  He denied pain of the hernia.  He stated lifting makes the hernia bulge more.  At the time of his prior visit, he wished to avoid surgery as he was asymptomatic.

## 2019-09-30 NOTE — CONSULT LETTER
[FreeTextEntry1] : 2019\par \par \par \par Paulie Talavera M.D.\par [address] [facility], [address]\[place] [address]\par Telephone #:  [phone]\par \par \par Re:  Russell Keller\par :  1955\par \par \par Dear Dr. Talavera:\par \par I had the opportunity to see Mr. Keller today for a follow up visit.  He believes the left inguinal hernia has enlarged.  He denied significant pain, although he intermittently has burning pain in the right groin that radiates down the front of his right leg.\par \par On physical examination, his height is 5 feet 5 inches, his weight is 149 pounds, and BMI is 24.86.  His temperature is 97.7 °F, blood pressure is 126/81, heart rate is 72, and O2 saturation is 98% on room air.  In general, he is a well-dressed, well-nourished man who appears his stated age and is in no acute distress.  He is calm, alert and oriented x 3.  HEENT exam demonstrates no scleral icterus and a normocephalic atraumatic appearance.  His abdomen is soft, non-tender, and non-distended.  There is no hepatosplenomegaly.  There is a small reducible umbilical hernia appreciated.  His extremities are warm and dry with no evidence of clubbing, cyanosis, or edema.  Bilateral groin examination demonstrates bilateral reducible, non-tender inguinal hernias, left greater than right.\par \par In summary, Mr. Keller is a 64-year-old man with a bilateral inguinal hernias and an umbilical hernia.  We will plan for a robotic bilateral inguinal hernia repair with mesh and umbilical hernia repair on 2019.\par \par Thank you for the opportunity to care for this patient.  Please do not hesitate to contact me in the event that you have any questions or concerns about the care of this patient.\par \par Sincerely,\par \par \par \par \par Jackelyn Fraser M.D.\par \par CC:\par Saritha Schilling M.D.\par [facility]\par  [address] [place] [address], [address]\[place]  [address]\par Telephone #: [phone]\par \par Raymond Blanchard M.D.\par [facility] [facility] [address]\[place]  [address]\par Telephone #: [phone]

## 2019-09-30 NOTE — ASSESSMENT
[FreeTextEntry1] : Mr. Keller is a 64-year-old man with a bilateral inguinal hernias and an umbilical hernia.  We will plan for a robotic bilateral inguinal hernia repair with mesh and umbilical hernia repair on November 5, 2019.

## 2019-09-30 NOTE — REVIEW OF SYSTEMS
[Earache] : no earache [Loss Of Hearing] : no hearing loss [Nosebleeds] : nosebleeds [Nasal Discharge] : no nasal discharge [Sore Throat] : no sore throat [Hoarseness] : no hoarseness [Heart Rate Is Fast] : the heart rate was not fast [Heart Rate Is Slow] : the heart rate was not slow [Chest Pain] : no chest pain [Palpitations] : no palpitations [Leg Claudication] : intermittent leg claudication [Lower Ext Edema] : no extremity edema [Vomiting] : no vomiting [Abdominal Pain] : no abdominal pain [Constipation] : constipation [Diarrhea] : no diarrhea [Heartburn] : no heartburn [Melena] : no melena [Arthralgias] : no arthralgias [Joint Pain] : joint pain [Joint Swelling] : no joint swelling [Joint Stiffness] : no joint stiffness [Limb Pain] : no limb pain [Limb Swelling] : no limb swelling [Negative] : Endocrine

## 2019-09-30 NOTE — PHYSICAL EXAM
[Calm] : calm [de-identified] : NAD, comfortable [de-identified] : NCAT, no scleral icterus [de-identified] : Bilateral reducible inguinal hernias, L > R. [de-identified] : No clubbing, cyanosis, or edema. [de-identified] : +BS soft NT ND.  No hepatosplenomegaly.  Small umbilical hernia, reducible and non-tender. [de-identified] : A&Ox3 [de-identified] : Warm, dry.

## 2019-12-03 ENCOUNTER — RESULT REVIEW (OUTPATIENT)
Age: 64
End: 2019-12-03

## 2019-12-03 ENCOUNTER — OUTPATIENT (OUTPATIENT)
Dept: OUTPATIENT SERVICES | Facility: HOSPITAL | Age: 64
LOS: 1 days | Discharge: HOME | End: 2019-12-03
Payer: COMMERCIAL

## 2019-12-03 DIAGNOSIS — I72.8 ANEURYSM OF OTHER SPECIFIED ARTERIES: Chronic | ICD-10-CM

## 2019-12-03 PROCEDURE — 49650 LAP ING HERNIA REPAIR INIT: CPT | Mod: 50,GC

## 2019-12-03 PROCEDURE — 49585: CPT | Mod: GC

## 2019-12-03 PROCEDURE — 88304 TISSUE EXAM BY PATHOLOGIST: CPT | Mod: 26

## 2019-12-03 PROCEDURE — 88302 TISSUE EXAM BY PATHOLOGIST: CPT | Mod: 26

## 2019-12-03 PROCEDURE — S2900 ROBOTIC SURGICAL SYSTEM: CPT | Mod: GC

## 2019-12-04 ENCOUNTER — MOBILE ON CALL (OUTPATIENT)
Age: 64
End: 2019-12-04

## 2019-12-04 ENCOUNTER — MESSAGE (OUTPATIENT)
Age: 64
End: 2019-12-04

## 2019-12-10 ENCOUNTER — APPOINTMENT (OUTPATIENT)
Dept: SURGERY | Facility: CLINIC | Age: 64
End: 2019-12-10
Payer: COMMERCIAL

## 2019-12-10 VITALS
OXYGEN SATURATION: 97 % | SYSTOLIC BLOOD PRESSURE: 127 MMHG | HEART RATE: 74 BPM | WEIGHT: 149 LBS | TEMPERATURE: 97.7 F | HEIGHT: 65 IN | BODY MASS INDEX: 24.83 KG/M2 | DIASTOLIC BLOOD PRESSURE: 78 MMHG

## 2019-12-10 DIAGNOSIS — N40.0 BENIGN PROSTATIC HYPERPLASIA WITHOUT LOWER URINARY TRACT SYMPMS: ICD-10-CM

## 2019-12-10 DIAGNOSIS — H26.9 UNSPECIFIED CATARACT: ICD-10-CM

## 2019-12-10 DIAGNOSIS — Z82.49 FAMILY HISTORY OF ISCHEMIC HEART DISEASE AND OTHER DISEASES OF THE CIRCULATORY SYSTEM: ICD-10-CM

## 2019-12-10 DIAGNOSIS — Z78.9 OTHER SPECIFIED HEALTH STATUS: ICD-10-CM

## 2019-12-10 DIAGNOSIS — K42.9 UMBILICAL HERNIA W/OUT OBSTRUCTION OR GANGRENE: ICD-10-CM

## 2019-12-10 DIAGNOSIS — Z87.891 PERSONAL HISTORY OF NICOTINE DEPENDENCE: ICD-10-CM

## 2019-12-10 DIAGNOSIS — K40.20 BILATERAL INGUINAL HERNIA, W/OUT OBSTRUCTION OR GANGRENE, NOT SPECIFIED AS RECURRENT: ICD-10-CM

## 2019-12-10 DIAGNOSIS — Z80.42 FAMILY HISTORY OF MALIGNANT NEOPLASM OF PROSTATE: ICD-10-CM

## 2019-12-10 DIAGNOSIS — Z86.79 PERSONAL HISTORY OF OTHER DISEASES OF THE CIRCULATORY SYSTEM: ICD-10-CM

## 2019-12-10 LAB — SURGICAL PATHOLOGY STUDY: SIGNIFICANT CHANGE UP

## 2019-12-10 PROCEDURE — 99024 POSTOP FOLLOW-UP VISIT: CPT

## 2019-12-10 NOTE — REASON FOR VISIT
[Post Op: _________] : a [unfilled] post op visit [FreeTextEntry1] : He underwent a robotic-assisted bilateral inguinal hernia repair with mesh and umbilical hernia repair on December 3, 2019.

## 2019-12-10 NOTE — CONSULT LETTER
[FreeTextEntry1] : December 10, 2019\par \par \par \par Paulie Talavera M.D.\par [address] [facility], [address]\[place] [address]\par Telephone #: [phone]\par \par \par Re: Russell Keller\par : 1955\par \par \par Dear Dr. Talavera:\par \par I had the opportunity to see Mr. Keller today for a routine post-operative visit after he underwent a robotic-assisted bilateral inguinal hernia repair with mesh and umbilical hernia repair on December 3, 2019.  He is overall feeling well.  He denied fever, chills, nausea, vomiting, diarrhea, or constipation.\par \par On physical examination, his height is 5 feet 5 inches, his weight is 149 pounds, and BMI is 24.79. His temperature is 97.7 °F, blood pressure is 127/78, heart rate is 74, and O2 saturation is 97% on room air. In general, he is a well-dressed, well-nourished man who appears his stated age and is in no acute distress. He is calm, alert and oriented x 3. HEENT exam demonstrates no scleral icterus and a normocephalic atraumatic appearance.  His abdomen is soft, non-tender, and non-distended. The incisions are healing well without erythema or induration.  There is no evidence of a recurrent umbilical hernia or any incisional hernias with Valsalva maneuver.  There is mild ecchymosis around the incisions, as expected.  His extremities are warm and dry with no evidence of clubbing, cyanosis, or edema.  Bilateral groin examination demonstrates no evidence of recurrent bilateral inguinal hernias.  There is some residual ecchymosis around the base of the penis, as expected.\par \par In summary, Mr. Keller is a 64-year-old man who underwent a robotic-assisted bilateral inguinal hernia repair with mesh and umbilical hernia repair on December 3, 2019.  He is recovering as expected and will follow up with me as needed.\par \par Thank you for the opportunity to care for this patient. Please do not hesitate to contact me in the event that you have any questions or concerns about the care of this patient.\par \par Sincerely,\par \par \par \par \par Jackelyn Fraser M.D.\par \par CC:\par Saritha Schilling M.D.\par [facility]\par  [address] [place] [address], [address]\[place] [address]\par Telephone #: [phone]\par \par Raymond Blanchard M.D.\par [address] [facility]\[place] [address]\par Telephone #: [phone]

## 2019-12-10 NOTE — ASSESSMENT
[FreeTextEntry1] : Mr. Keller is a 64-year-old man who underwent a robotic-assisted bilateral inguinal hernia repair with mesh and umbilical hernia repair on December 3, 2019.  He is recovering as expected and will follow up with me as needed.

## 2019-12-10 NOTE — REVIEW OF SYSTEMS
[Earache] : no earache [Loss Of Hearing] : no hearing loss [Nosebleeds] : nosebleeds [Nasal Discharge] : no nasal discharge [Sore Throat] : no sore throat [Hoarseness] : no hoarseness [Heart Rate Is Slow] : the heart rate was not slow [Heart Rate Is Fast] : the heart rate was not fast [Chest Pain] : no chest pain [Palpitations] : no palpitations [Leg Claudication] : intermittent leg claudication [Lower Ext Edema] : no extremity edema [Abdominal Pain] : no abdominal pain [Vomiting] : no vomiting [Constipation] : constipation [Diarrhea] : no diarrhea [Heartburn] : no heartburn [Melena] : no melena [Arthralgias] : no arthralgias [Joint Pain] : joint pain [Joint Stiffness] : no joint stiffness [Joint Swelling] : no joint swelling [Limb Pain] : no limb pain [Limb Swelling] : no limb swelling [Negative] : Heme/Lymph

## 2019-12-10 NOTE — PHYSICAL EXAM
[Calm] : calm [de-identified] : NAD, comfortable [de-identified] : NCAT, no scleral icterus [de-identified] : soft NT ND.  Incisions healing well without erythema or induration.  No evidence of a recurrent umbilical hernia or any incisional hernias on Valsalva maneuver. [de-identified] : Bilateral groin examination demonstrates no evidence of a recurrent inguinal hernia bilaterally with Valsalva maneuver. [de-identified] : No clubbing, cyanosis, or edema. [de-identified] : Warm, dry. [de-identified] : A&Ox3

## 2019-12-10 NOTE — HISTORY OF PRESENT ILLNESS
[de-identified] : Mr. Keller presented previously for evaluation and management of a left inguinal hernia.  He stated the hernia has been present for approximately 2 years.  He noted a bulge in the left groin.  He denied pain of the hernia.  He stated lifting makes the hernia bulge more.  At the time of his prior visit, he wished to avoid surgery as he was asymptomatic.  He presented on September 30, 2019, for a follow up visit.  He believed the left inguinal hernia has enlarged.  He denied significant pain, although he intermittently had burning pain in the right groin that radiated down the front of his right leg.  He underwent a robotic-assisted bilateral inguinal hernia repair with mesh and umbilical hernia repair on December 3, 2019. [de-identified] : He presented today for a routine post-operative visit.  He is overall feeling well.  He denied fever, chills, nausea, vomiting, diarrhea, or constipation.

## 2020-02-08 ENCOUNTER — EMERGENCY (EMERGENCY)
Facility: HOSPITAL | Age: 65
LOS: 1 days | Discharge: HOME | End: 2020-02-08
Attending: EMERGENCY MEDICINE | Admitting: EMERGENCY MEDICINE
Payer: COMMERCIAL

## 2020-02-08 VITALS
HEART RATE: 74 BPM | TEMPERATURE: 98 F | RESPIRATION RATE: 16 BRPM | OXYGEN SATURATION: 99 % | DIASTOLIC BLOOD PRESSURE: 81 MMHG | HEIGHT: 65 IN | SYSTOLIC BLOOD PRESSURE: 128 MMHG | WEIGHT: 145.95 LBS

## 2020-02-08 VITALS
HEART RATE: 59 BPM | OXYGEN SATURATION: 98 % | DIASTOLIC BLOOD PRESSURE: 76 MMHG | SYSTOLIC BLOOD PRESSURE: 135 MMHG | RESPIRATION RATE: 16 BRPM | TEMPERATURE: 98 F

## 2020-02-08 DIAGNOSIS — I72.8 ANEURYSM OF OTHER SPECIFIED ARTERIES: Chronic | ICD-10-CM

## 2020-02-08 LAB
ALBUMIN SERPL ELPH-MCNC: 4.7 G/DL — SIGNIFICANT CHANGE UP (ref 3.3–5)
ALP SERPL-CCNC: 68 U/L — SIGNIFICANT CHANGE UP (ref 40–120)
ALT FLD-CCNC: 22 U/L — SIGNIFICANT CHANGE UP (ref 10–45)
ANION GAP SERPL CALC-SCNC: 12 MMOL/L — SIGNIFICANT CHANGE UP (ref 5–17)
APTT BLD: 38.5 SEC — HIGH (ref 27.5–36.3)
AST SERPL-CCNC: 25 U/L — SIGNIFICANT CHANGE UP (ref 10–40)
BASOPHILS # BLD AUTO: 0.04 K/UL — SIGNIFICANT CHANGE UP (ref 0–0.2)
BASOPHILS NFR BLD AUTO: 0.7 % — SIGNIFICANT CHANGE UP (ref 0–2)
BILIRUB SERPL-MCNC: 2.1 MG/DL — HIGH (ref 0.2–1.2)
BUN SERPL-MCNC: 18 MG/DL — SIGNIFICANT CHANGE UP (ref 7–23)
CALCIUM SERPL-MCNC: 9.4 MG/DL — SIGNIFICANT CHANGE UP (ref 8.4–10.5)
CHLORIDE SERPL-SCNC: 104 MMOL/L — SIGNIFICANT CHANGE UP (ref 96–108)
CO2 SERPL-SCNC: 25 MMOL/L — SIGNIFICANT CHANGE UP (ref 22–31)
CREAT SERPL-MCNC: 0.91 MG/DL — SIGNIFICANT CHANGE UP (ref 0.5–1.3)
EOSINOPHIL # BLD AUTO: 0.13 K/UL — SIGNIFICANT CHANGE UP (ref 0–0.5)
EOSINOPHIL NFR BLD AUTO: 2.3 % — SIGNIFICANT CHANGE UP (ref 0–6)
GLUCOSE SERPL-MCNC: 101 MG/DL — HIGH (ref 70–99)
HCT VFR BLD CALC: 44.6 % — SIGNIFICANT CHANGE UP (ref 39–50)
HGB BLD-MCNC: 15.3 G/DL — SIGNIFICANT CHANGE UP (ref 13–17)
IMM GRANULOCYTES NFR BLD AUTO: 0.2 % — SIGNIFICANT CHANGE UP (ref 0–1.5)
INR BLD: 0.97 — SIGNIFICANT CHANGE UP (ref 0.88–1.16)
LYMPHOCYTES # BLD AUTO: 1.9 K/UL — SIGNIFICANT CHANGE UP (ref 1–3.3)
LYMPHOCYTES # BLD AUTO: 33.6 % — SIGNIFICANT CHANGE UP (ref 13–44)
MCHC RBC-ENTMCNC: 32.3 PG — SIGNIFICANT CHANGE UP (ref 27–34)
MCHC RBC-ENTMCNC: 34.3 GM/DL — SIGNIFICANT CHANGE UP (ref 32–36)
MCV RBC AUTO: 94.3 FL — SIGNIFICANT CHANGE UP (ref 80–100)
MONOCYTES # BLD AUTO: 0.49 K/UL — SIGNIFICANT CHANGE UP (ref 0–0.9)
MONOCYTES NFR BLD AUTO: 8.7 % — SIGNIFICANT CHANGE UP (ref 2–14)
NEUTROPHILS # BLD AUTO: 3.08 K/UL — SIGNIFICANT CHANGE UP (ref 1.8–7.4)
NEUTROPHILS NFR BLD AUTO: 54.5 % — SIGNIFICANT CHANGE UP (ref 43–77)
NRBC # BLD: 0 /100 WBCS — SIGNIFICANT CHANGE UP (ref 0–0)
PLATELET # BLD AUTO: 195 K/UL — SIGNIFICANT CHANGE UP (ref 150–400)
POTASSIUM SERPL-MCNC: 4.9 MMOL/L — SIGNIFICANT CHANGE UP (ref 3.5–5.3)
POTASSIUM SERPL-SCNC: 4.9 MMOL/L — SIGNIFICANT CHANGE UP (ref 3.5–5.3)
PROT SERPL-MCNC: 7.6 G/DL — SIGNIFICANT CHANGE UP (ref 6–8.3)
PROTHROM AB SERPL-ACNC: 11 SEC — SIGNIFICANT CHANGE UP (ref 10–12.9)
RBC # BLD: 4.73 M/UL — SIGNIFICANT CHANGE UP (ref 4.2–5.8)
RBC # FLD: 11.9 % — SIGNIFICANT CHANGE UP (ref 10.3–14.5)
SODIUM SERPL-SCNC: 141 MMOL/L — SIGNIFICANT CHANGE UP (ref 135–145)
TROPONIN T SERPL-MCNC: <0.01 NG/ML — SIGNIFICANT CHANGE UP (ref 0–0.01)
TROPONIN T SERPL-MCNC: <0.01 NG/ML — SIGNIFICANT CHANGE UP (ref 0–0.01)
WBC # BLD: 5.65 K/UL — SIGNIFICANT CHANGE UP (ref 3.8–10.5)
WBC # FLD AUTO: 5.65 K/UL — SIGNIFICANT CHANGE UP (ref 3.8–10.5)

## 2020-02-08 PROCEDURE — 80053 COMPREHEN METABOLIC PANEL: CPT

## 2020-02-08 PROCEDURE — 85730 THROMBOPLASTIN TIME PARTIAL: CPT

## 2020-02-08 PROCEDURE — 71275 CT ANGIOGRAPHY CHEST: CPT | Mod: 26

## 2020-02-08 PROCEDURE — 99285 EMERGENCY DEPT VISIT HI MDM: CPT | Mod: 25

## 2020-02-08 PROCEDURE — 71275 CT ANGIOGRAPHY CHEST: CPT

## 2020-02-08 PROCEDURE — 85379 FIBRIN DEGRADATION QUANT: CPT

## 2020-02-08 PROCEDURE — 74174 CTA ABD&PLVS W/CONTRAST: CPT

## 2020-02-08 PROCEDURE — 99284 EMERGENCY DEPT VISIT MOD MDM: CPT | Mod: 25

## 2020-02-08 PROCEDURE — 85025 COMPLETE CBC W/AUTO DIFF WBC: CPT

## 2020-02-08 PROCEDURE — 71045 X-RAY EXAM CHEST 1 VIEW: CPT | Mod: 26

## 2020-02-08 PROCEDURE — 84484 ASSAY OF TROPONIN QUANT: CPT

## 2020-02-08 PROCEDURE — 93010 ELECTROCARDIOGRAM REPORT: CPT

## 2020-02-08 PROCEDURE — 93005 ELECTROCARDIOGRAM TRACING: CPT

## 2020-02-08 PROCEDURE — 36415 COLL VENOUS BLD VENIPUNCTURE: CPT

## 2020-02-08 PROCEDURE — 74174 CTA ABD&PLVS W/CONTRAST: CPT | Mod: 26

## 2020-02-08 PROCEDURE — 71045 X-RAY EXAM CHEST 1 VIEW: CPT

## 2020-02-08 PROCEDURE — 85610 PROTHROMBIN TIME: CPT

## 2020-02-08 RX ORDER — ACETAMINOPHEN 500 MG
650 TABLET ORAL ONCE
Refills: 0 | Status: COMPLETED | OUTPATIENT
Start: 2020-02-08 | End: 2020-02-08

## 2020-02-08 RX ADMIN — Medication 650 MILLIGRAM(S): at 14:18

## 2020-02-08 NOTE — ED ADULT NURSE NOTE - OBJECTIVE STATEMENT
Patient is a 65yo male reporting upper back pain radiating to midsternal chest region x3 weeks, worse yesterday and today. Patient has hx of hypercholesterolemia, on Atorvastatin. Denies abdominal pain, n/v/d, shortness of breath, fevers. Speaking clearly in full sentences.

## 2020-02-08 NOTE — ED PROVIDER NOTE - DIAGNOSTIC INTERPRETATION
ER Physician: Lucia Burns MD  CHEST XRAY INTERPRETATION: lungs clear, heart shadow normal, bony structures intact

## 2020-02-08 NOTE — ED ADULT TRIAGE NOTE - CHIEF COMPLAINT QUOTE
Patient c/o chest pain , upper back pain and nausea for 3 weeks got worse yesterday . History of CAD . Denies any sob .

## 2020-02-08 NOTE — ED ADULT NURSE NOTE - PMH
Abnormal CT scan of lung  CT Chest 5/15/15 @[facility]: Mild small and large airways disease which may be secondary to nontuberculous mycobacterial infection in the appropriate clinical setting. 2.  Trace pericardial effusion.  Artery aneurysm, upper extremity  unknown type of residual disease of Left upper extremity artery (pt stated not repairable)  BPH (benign prostatic hypertrophy)    Cholelithiasis    Vertigo

## 2020-02-08 NOTE — ED PROVIDER NOTE - PMH
Abnormal CT scan of lung  CT Chest 5/15/15 @[facility]: Mild small and large airways disease which may be secondary to nontuberculous mycobacterial infection in the appropriate clinical setting. 2.  Trace pericardial effusion.  Artery aneurysm, upper extremity  unknown type of residual disease of Left upper extremity artery (pt stated not repairable)  BPH (benign prostatic hypertrophy)    Cholelithiasis    Vertigo

## 2020-02-08 NOTE — ED PROVIDER NOTE - NSFOLLOWUPINSTRUCTIONS_ED_ALL_ED_FT
Can take tylenol 650mg every 6hrs as needed for pain.  Follow up with primary doctor within 1-2 days.  Follow up with cardiologist within 1-2 days. Can call [phone] ([facility]) to schedule appointment.   Return to ER for persistent fever/vomit, uncontrolled pain, worsening breathing, worsening lightheaded, focal weakness/numbness.    Nonspecific Chest Pain  Chest pain can be caused by many different conditions. It can be caused by a condition that is life-threatening and requires treatment right away. It can also be caused by something that is not life-threatening. If you have chest pain, it can be hard to know the difference, so it is important to get help right away to make sure that you do not have a serious condition.    Some life-threatening causes of chest pain include:  Heart attack.  A tear in the body's main blood vessel (aortic dissection).  Inflammation around your heart (pericarditis).  A problem in the lungs, such as a blood clot (pulmonary embolism) or a collapsed lung (pneumothorax).    Some non life-threatening causes of chest pain include:  Heartburn.  Anxiety or stress.  Damage to the bones, muscles, and cartilage that make up your chest wall.  Pneumonia or bronchitis.  Shingles infection (varicella-zoster virus).    Chest pain can feel like:  Pain or discomfort on the surface of your chest or deep in your chest.  Crushing, pressure, aching, or squeezing pain.  Burning or tingling.  Dull or sharp pain that is worse when you move, cough, or take a deep breath.  Pain or discomfort that is also felt in your back, neck, jaw, shoulder, or arm, or pain that spreads to any of these areas.  Your chest pain may come and go. It may also be constant. Your health care provider will do lab tests and other studies to find the cause of your pain. Treatment will depend on the cause of your chest pain.    Follow these instructions at home:  Pay attention to any changes in your symptoms. Tell your health care provider about them or any new symptoms. Follow these instructions from your health care provider.    Medicines   Take over-the-counter and prescription medicines only as told by your health care provider.  If you were prescribed an antibiotic, take it as told by your health care provider. Do not stop taking the antibiotic even if you start to feel better.    Lifestyle    Rest as directed by your health care provider.  Do not use any products that contain nicotine or tobacco, such as cigarettes and e-cigarettes. If you need help quitting, ask your health care provider.  Do not drink alcohol.  Make healthy lifestyle choices as recommended. These may include:  Getting regular exercise. Ask your health care provider to suggest some activities that are safe for you.  Eating a heart-healthy diet. This includes plenty of fresh fruits and vegetables, whole grains, low-fat (lean) protein, and low-fat dairy products. A dietitian can help you find healthy eating options.  Maintaining a healthy weight.  Managing any other health conditions you have, such as hypertension or diabetes.  Reducing stress, such as with yoga or relaxation techniques.    General instructions   Avoid any activities that bring on chest pain.  Keep all follow-up visits as told by your health care provider. This is important. This includes visits for any further testing if your chest pain does not go away.    Contact a health care provider if:  Your chest pain does not go away.  You feel depressed.  You have a fever.    Get help right away if:  Your chest pain gets worse.  You have a cough that gets worse, or you cough up blood.  You have severe pain in your abdomen.  You faint.  You have sudden, unexplained chest discomfort.  You have sudden, unexplained discomfort in your arms, back, neck, or jaw.  You have shortness of breath at any time.  You suddenly start to sweat, or your skin gets clammy.  You feel nausea or you vomit.  You suddenly feel lightheaded or dizzy.  You have severe weakness, or unexplained weakness or fatigue.  Your heart begins to beat quickly, or it feels like it is skipping beats.

## 2020-02-08 NOTE — ED PROVIDER NOTE - OBJECTIVE STATEMENT
65 yo PMhx of BPH, hyperlipidemia, arthritis w cc of 3 weeks of chest pain r side of chest radiating to back, worse w deep inspiration. no worsening of sx on exertion, prior smoker. no pain to mid chest, radiation of pain, diaphoresis. no abd pain, nausea, vomiting. states similar sx had also occurred in the past, last stress one year ago wnl per pt, sees cardiologist regularly.

## 2020-02-08 NOTE — ED PROVIDER NOTE - PROGRESS NOTE DETAILS
NAD, wnl- to NAD but pt states pain still persisting slightly to the back; will eval for dissection and dispo, dc if negative.

## 2020-02-08 NOTE — ED PROVIDER NOTE - CLINICAL SUMMARY MEDICAL DECISION MAKING FREE TEXT BOX
Pt w chest pain, well appearing, no active pain only on deep inspiration, improved w medications. will obtain cardiac evaluation and reassess.

## 2020-02-08 NOTE — ED PROVIDER NOTE - PATIENT PORTAL LINK FT
You can access the FollowMyHealth Patient Portal offered by [facility] by registering at the following website: [email or web address]. By joining [employer]’s FollowMyHealth portal, you will also be able to view your health information using other applications (apps) compatible with our system. You can access the FollowMyHealth Patient Portal offered by [facility] by registering at the following website: [email or web address]. By joining [facility]’s FollowMyHealth portal, you will also be able to view your health information using other applications (apps) compatible with our system.

## 2020-02-12 ENCOUNTER — OUTPATIENT (OUTPATIENT)
Dept: OUTPATIENT SERVICES | Facility: HOSPITAL | Age: 65
LOS: 1 days | End: 2020-02-12
Payer: COMMERCIAL

## 2020-02-12 DIAGNOSIS — I72.8 ANEURYSM OF OTHER SPECIFIED ARTERIES: Chronic | ICD-10-CM

## 2020-02-12 PROCEDURE — 76705 ECHO EXAM OF ABDOMEN: CPT | Mod: 26

## 2020-02-12 PROCEDURE — 76705 ECHO EXAM OF ABDOMEN: CPT

## 2020-02-17 DIAGNOSIS — R07.89 OTHER CHEST PAIN: ICD-10-CM

## 2020-02-20 ENCOUNTER — APPOINTMENT (OUTPATIENT)
Dept: ULTRASOUND IMAGING | Facility: HOSPITAL | Age: 65
End: 2020-02-20

## 2020-04-25 ENCOUNTER — MESSAGE (OUTPATIENT)
Age: 65
End: 2020-04-25

## 2020-05-04 ENCOUNTER — APPOINTMENT (OUTPATIENT)
Dept: DISASTER EMERGENCY | Facility: CLINIC | Age: 65
End: 2020-05-04

## 2020-05-05 LAB
SARS-COV-2 IGG SERPL IA-ACNC: <0.1 INDEX
SARS-COV-2 IGG SERPL QL IA: NEGATIVE

## 2020-11-15 ENCOUNTER — TRANSCRIPTION ENCOUNTER (OUTPATIENT)
Age: 65
End: 2020-11-15

## 2020-12-13 NOTE — ED PROVIDER NOTE - CONDUCTED A DETAILED DISCUSSION WITH PATIENT AND/OR GUARDIAN REGARDING, MDM
Dear Team Member,    Per your recent telephone screening with Employee Health:    Â· Your lab appointment was scheduled during today's call. At [facility] 12/14/2020 940am; [address], [address]; Arrive at the covered canopy by the [employer]. Â· You are to remain off work and isolate yourself from  public places except to seek medical care  Â· Complete the symptom tracker daily on [employer] [facility] to comply with your actively monitoring status    YOU MAY  [address]. It is not necessary to call Employee Health. The Central COVID Team will contact you at the appropriate interval for clearance. Off work start date: 12/13/20        [address] if you have a marked increase  in symptoms while you are off. [phone] or email [email or web address].    For [place] Employees, please follow this link to view a copy of the consent form:  [place] Employee Consent Form     For [place] Employees, please follow this link to view a copy of the consent form:   [place] Employee Consent Form     Thank you,    Dary LUJAN Team    [phone]    Hours: M-F [phone] Saturday - please answer your phone if an outside line is calling, regardless of hours we work 7days a week and will follow up as appropriate. [email or web address]    Cc:  Manager
return to ED if symptoms worsen, persist or questions arise/need for outpatient follow-up

## 2021-03-08 ENCOUNTER — TRANSCRIPTION ENCOUNTER (OUTPATIENT)
Age: 66
End: 2021-03-08

## 2021-04-26 ENCOUNTER — APPOINTMENT (OUTPATIENT)
Dept: VASCULAR SURGERY | Facility: CLINIC | Age: 66
End: 2021-04-26
Payer: MEDICARE

## 2021-04-26 DIAGNOSIS — Z01.818 ENCOUNTER FOR OTHER PREPROCEDURAL EXAMINATION: ICD-10-CM

## 2021-04-26 PROCEDURE — 99213 OFFICE O/P EST LOW 20 MIN: CPT

## 2021-04-26 PROCEDURE — 99072 ADDL SUPL MATRL&STAF TM PHE: CPT

## 2021-04-26 PROCEDURE — 93971 EXTREMITY STUDY: CPT

## 2021-04-28 NOTE — ASSESSMENT
[FreeTextEntry1] : 64 y/o M w/PMH varicose veins s/p L GSV RFA in 2013 followed by R GSV RFA in 4/2017 presents with new bulging varicose veins. On exam, LLE: Bulging varicosities appreciated over the medial thigh. Feet are pink, toes are warm to touch with adequate capillary refill. \par \par LLE venous US shows: Negative DVT/SVT. GSV closed not visualized post procedure. Gross varicose veins tributaries noted through the thigh and calf. \par \par Plan:\par Discussed findings and treatment options. Given symptomatic varicosities over the L leg. LLE stab phlebectomy recommended. Conservative measures with compression stockings and OTC's with minimal relief. The risks, benefits, convalescence and alternatives were reviewed. All questions addressed to patients satisfaction. Patient would like to move forward with procedure. Once approved by his insurance will contact patient to schedule date. Furthermore, regarding "flushing sensation" to his neck likely secondary to palpitations recommended if bothersome to f/u with his PCP for this.

## 2021-04-28 NOTE — ADDENDUM
[FreeTextEntry1] : This note was written by Roxie Silveira on 04/26/2021 acting as scribe for Yu Best M.D.\par \par I, Yu Turner have read and attest that all the information, medical decision making and discharge instructions within are true and accurate.

## 2021-04-28 NOTE — PROCEDURE
[FreeTextEntry1] : LLE venous US shows: Negative DVT/SVT. GSV closed not visualized post procedure. Gross varicose veins tributaries noted through the thigh and calf.

## 2021-04-28 NOTE — HISTORY OF PRESENT ILLNESS
[FreeTextEntry1] : 66 y/o M w/PMH varicose veins s/p L GSV RFA in 2013 followed by R GSV RFA in 4/2017. Patient last seen here in 2019 for ankle edema and new bulging varicose veins he underwent conservative measures with compression stockings. Today, patient reports feeling well overall; except he does mention noticing varicose veins which have become more pronounced. He notes varicosities to be tender, itchy and heavy particularly at night after activities. He has tried using compression stockings of 20-30 mmHg with minimal relief. Furthermore, patient reports while running he experiences palpitations with "flushing sensation" radiating up to his neck  but quickly resolves and does not interfere in his exercising activities. He denies any vision changes, speech impairment, CVA/TIA. Denies any skin breakdown, skin discoloration, fever or chills.

## 2021-04-28 NOTE — PHYSICAL EXAM
[Respiratory Effort] : normal respiratory effort [Normal Rate and Rhythm] : normal rate and rhythm [Alert] : alert [Oriented to Person] : oriented to person [Oriented to Place] : oriented to place [Oriented to Time] : oriented to time [Calm] : calm [Varicose Veins Of Lower Extremities] : present [Varicose Veins Of The Left Leg] : of the left leg [Ankle Swelling On The Left] : moderate [Ankle Swelling (On Exam)] : not present [] : not present [Abdomen Tenderness] : ~T ~M No abdominal tenderness [de-identified] : Well appearing, cooperative [de-identified] : NC/AT  [de-identified] : Supple  [de-identified] : FROM [de-identified] : LLE: Bulging varicosities appreciated over the medial thigh. Feet are pink, toes are warm to touch with adequate capillary refill.

## 2021-05-08 ENCOUNTER — APPOINTMENT (OUTPATIENT)
Dept: DISASTER EMERGENCY | Facility: CLINIC | Age: 66
End: 2021-05-08

## 2021-05-08 DIAGNOSIS — Z01.818 ENCOUNTER FOR OTHER PREPROCEDURAL EXAMINATION: ICD-10-CM

## 2021-05-09 LAB — SARS-COV-2 N GENE NPH QL NAA+PROBE: NOT DETECTED

## 2021-05-10 ENCOUNTER — TRANSCRIPTION ENCOUNTER (OUTPATIENT)
Age: 66
End: 2021-05-10

## 2021-05-11 ENCOUNTER — APPOINTMENT (OUTPATIENT)
Dept: VASCULAR SURGERY | Facility: HOSPITAL | Age: 66
End: 2021-05-11

## 2021-05-11 ENCOUNTER — OUTPATIENT (OUTPATIENT)
Dept: OUTPATIENT SERVICES | Facility: HOSPITAL | Age: 66
LOS: 1 days | Discharge: HOME | End: 2021-05-11
Payer: MEDICARE

## 2021-05-11 DIAGNOSIS — I72.8 ANEURYSM OF OTHER SPECIFIED ARTERIES: Chronic | ICD-10-CM

## 2021-05-11 PROCEDURE — 37766 PHLEB VEINS - EXTREM 20+: CPT | Mod: LT,GC

## 2021-05-24 ENCOUNTER — APPOINTMENT (OUTPATIENT)
Dept: VASCULAR SURGERY | Facility: CLINIC | Age: 66
End: 2021-05-24
Payer: MEDICARE

## 2021-05-24 PROCEDURE — 99213 OFFICE O/P EST LOW 20 MIN: CPT

## 2021-05-24 PROCEDURE — 99072 ADDL SUPL MATRL&STAF TM PHE: CPT

## 2021-05-25 NOTE — ASSESSMENT
[Arterial/Venous Disease] : arterial/venous disease [Medication Management] : medication management [Foot care/Footwear] : foot care/footwear [FreeTextEntry1] : 65 y/o M w/PMH varicose veins s/p L GSV RFA in 2013 followed by R GSV RFA in 4/2017 s/p L leg stab phlebectomy on 5/11/2021 presents for stab phlebectomy follow up. LLE: mild ecchymosis over the left leg. Slight area of induration over the thigh with sutures in place, no active drainage, no purulence. RLE: bulging varicose veins over the calf. Toes are warm to touch with adequate capillary refill. \par \par LLE: sutures removed from the left thigh and lower leg. Patient tolerated well. \par \par Plan: \par I explained to pt area of numbness to the lower left leg will gradually improve over time. \par Moisturize legs daily avoid any skin cracks/ open ulcers or wounds and apply Vitamin A &D over the incisions. Should he be exposed to the sun pt recommend to apply sun screen over the legs.\par No vascular surgery interventions recommended at this time. \par To follow up here in November after returning from his trip from [place] to discuss options for RLE bulging symptomatic varicose veins.

## 2021-05-25 NOTE — PHYSICAL EXAM
[Respiratory Effort] : normal respiratory effort [Normal Rate and Rhythm] : normal rate and rhythm [Alert] : alert [Oriented to Person] : oriented to person [Oriented to Place] : oriented to place [Oriented to Time] : oriented to time [Calm] : calm [2+] : left 2+ [Varicose Veins Of The Right Leg] : of the right leg [Varicose Veins Of Lower Extremities] : present [Ankle Swelling On The Left] : moderate [Ankle Swelling (On Exam)] : not present [] : not present [Abdomen Tenderness] : ~T ~M No abdominal tenderness [de-identified] : Well appearing  [de-identified] : NC/AT  [de-identified] : Supple  [de-identified] : FROM [de-identified] : LLE: mild ecchymosis over the left leg. Slight area of induration over the thigh with sutures in place, no active drainage, no purulence. RLE: bulging varicose veins over the calf. Toes are warm to touch with adequate capillary refill. \par

## 2021-05-25 NOTE — HISTORY OF PRESENT ILLNESS
[FreeTextEntry1] : 65 y/o M w/PMH varicose veins s/p L GSV RFA in 2013 followed by R GSV RFA in 4/2017 s/p L leg stab phlebectomy on 5/11/2021 presents for stab phlebectomy follow up. Patient reports feeling well overall. he is very happy with the outcome. He states his leg feels lighter and notes slight black and blue bruising to be resolving. He does mention small area of induration to the medial thigh and slight decrease to sensation over the lower left leg. He reports recently retired and plans to travel to [place] soon. Furthermore, patient c/o RLE bulging varicose veins to be bothersome particularly to the lower leg. She states they bulge, itch and interfere with his daily life activities. He has tried leg elevation and NSAIDs with somewhat relief. He would like to discuss options when he returning his trip.Otherwise patient with no reports of rest pain, open sores/ulcers, fever or chills.

## 2021-12-28 ENCOUNTER — TRANSCRIPTION ENCOUNTER (OUTPATIENT)
Age: 66
End: 2021-12-28

## 2022-03-16 NOTE — ED PROVIDER NOTE - GENITOURINARY NEGATIVE STATEMENT, MLM
Initiate Treatment: triamcinolone acetonide 0.1 % topical cream BID\\nQuantity: 454.0 g  Days Supply: 30\\nSig: Apply twice daily to rash up to 2 weeks Detail Level: Simple Otc Regimen: Cold compresses as needed no dysuria, no frequency, and no hematuria.

## 2022-03-26 ENCOUNTER — TRANSCRIPTION ENCOUNTER (OUTPATIENT)
Age: 67
End: 2022-03-26

## 2023-01-18 ENCOUNTER — OFFICE (OUTPATIENT)
Dept: URBAN - METROPOLITAN AREA CLINIC 28 | Facility: CLINIC | Age: 68
Setting detail: OPHTHALMOLOGY
End: 2023-01-18
Payer: COMMERCIAL

## 2023-01-18 DIAGNOSIS — Z96.1: ICD-10-CM

## 2023-01-18 DIAGNOSIS — H33.312: ICD-10-CM

## 2023-01-18 DIAGNOSIS — H43.393: ICD-10-CM

## 2023-01-18 DIAGNOSIS — H02.89: ICD-10-CM

## 2023-01-18 DIAGNOSIS — H33.8: ICD-10-CM

## 2023-01-18 PROCEDURE — 92202 OPSCPY EXTND ON/MAC DRAW: CPT | Performed by: OPHTHALMOLOGY

## 2023-01-18 PROCEDURE — 92134 CPTRZ OPH DX IMG PST SGM RTA: CPT | Performed by: OPHTHALMOLOGY

## 2023-01-18 PROCEDURE — 92014 COMPRE OPH EXAM EST PT 1/>: CPT | Performed by: OPHTHALMOLOGY

## 2023-01-18 ASSESSMENT — LID EXAM ASSESSMENTS
OS_MEIBOMITIS: 2+
OD_MEIBOMITIS: 2+

## 2023-01-18 ASSESSMENT — REFRACTION_CURRENTRX
OS_ADD: +3.00
OS_OVR_VA: 20/
OS_VPRISM_DIRECTION: PROGS
OS_SPHERE: -2.00
OS_AXIS: 003
OD_VPRISM_DIRECTION: PROGS
OD_OVR_VA: 20/
OD_SPHERE: -1.25
OD_CYLINDER: -0.75
OD_AXIS: 144
OD_ADD: +3.00
OS_CYLINDER: -1.25

## 2023-01-18 ASSESSMENT — SPHEQUIV_DERIVED
OS_SPHEQUIV: -2.875
OS_SPHEQUIV: -2.875
OD_SPHEQUIV: -1.625
OD_SPHEQUIV: -1.75
OS_SPHEQUIV: -2.625
OD_SPHEQUIV: -1.875

## 2023-01-18 ASSESSMENT — KERATOMETRY
OD_K2POWER_DIOPTERS: 44.00
METHOD_AUTO_MANUAL: AUTO
OS_K2POWER_DIOPTERS: 45.00
OD_K1POWER_DIOPTERS: 43.00
OS_K1POWER_DIOPTERS: 42.75
OS_AXISANGLE_DEGREES: 088
OD_AXISANGLE_DEGREES: 075

## 2023-01-18 ASSESSMENT — CONFRONTATIONAL VISUAL FIELD TEST (CVF)
OS_FINDINGS: FULL
OD_FINDINGS: FULL

## 2023-01-18 ASSESSMENT — REFRACTION_MANIFEST
OD_AXIS: 135
OD_CYLINDER: -1.00
OS_AXIS: 180
OD_ADD: +3.00
OD_ADD: +2.50
OS_VA1: 20/25
OS_AXIS: 180
OS_VA1: 20/25+2
OD_CYLINDER: -0.75
OS_CYLINDER: -1.75
OS_ADD: +3.00
OS_ADD: +2.50
OS_SPHERE: -2.00
OS_SPHERE: -2.00
OD_VA1: 20/25-
OS_CYLINDER: -1.25
OD_SPHERE: -1.25
OS_VA2: 20/20(J1+)
OD_VA2: 20/20(J1+)
OD_AXIS: 135
OD_VA1: 20/40
OD_SPHERE: -1.50

## 2023-01-18 ASSESSMENT — TONOMETRY
OS_IOP_MMHG: 15
OD_IOP_MMHG: 15

## 2023-01-18 ASSESSMENT — REFRACTION_AUTOREFRACTION
OD_SPHERE: -1.25
OS_CYLINDER: -2.75
OD_AXIS: 157
OD_CYLINDER: -0.75
OS_AXIS: 179
OS_SPHERE: -1.50

## 2023-01-18 ASSESSMENT — AXIALLENGTH_DERIVED
OD_AL: 24.294
OS_AL: 24.5114
OS_AL: 24.617
OD_AL: 24.2425
OD_AL: 24.3458
OS_AL: 24.617

## 2023-01-18 ASSESSMENT — VISUAL ACUITY
OD_BCVA: 20/25
OS_BCVA: 20/30+2

## 2023-02-02 ENCOUNTER — NON-APPOINTMENT (OUTPATIENT)
Age: 68
End: 2023-02-02

## 2023-03-02 NOTE — ED ADULT NURSE NOTE - NS ED NURSE DISCH DISPOSITION
Discharged Ear Star Wedge Flap Text: The defect edges were debeveled with a #15 blade scalpel.  Given the location of the defect and the proximity to free margins (helical rim) an ear star wedge flap was deemed most appropriate.  Using a sterile surgical marker, the appropriate flap was drawn incorporating the defect and placing the expected incisions between the helical rim and antihelix where possible.  The area thus outlined was incised through and through with a #15 scalpel blade.

## 2023-11-28 ENCOUNTER — NON-APPOINTMENT (OUTPATIENT)
Age: 68
End: 2023-11-28

## 2024-02-21 ENCOUNTER — OFFICE (OUTPATIENT)
Dept: URBAN - METROPOLITAN AREA CLINIC 28 | Facility: CLINIC | Age: 69
Setting detail: OPHTHALMOLOGY
End: 2024-02-21
Payer: COMMERCIAL

## 2024-02-21 DIAGNOSIS — H43.393: ICD-10-CM

## 2024-02-21 DIAGNOSIS — H33.8: ICD-10-CM

## 2024-02-21 DIAGNOSIS — H33.312: ICD-10-CM

## 2024-02-21 DIAGNOSIS — H02.89: ICD-10-CM

## 2024-02-21 PROCEDURE — 92014 COMPRE OPH EXAM EST PT 1/>: CPT | Performed by: OPHTHALMOLOGY

## 2024-02-21 ASSESSMENT — SPHEQUIV_DERIVED
OS_SPHEQUIV: -2.875
OS_SPHEQUIV: -2.625
OS_SPHEQUIV: -3.125
OD_SPHEQUIV: -1.625
OD_SPHEQUIV: -1.875
OD_SPHEQUIV: -1.75

## 2024-02-21 ASSESSMENT — REFRACTION_CURRENTRX
OD_ADD: +2.50
OD_OVR_VA: 20/
OD_AXIS: 144
OS_SPHERE: -2.00
OS_CYLINDER: -1.25
OS_OVR_VA: 20/
OS_ADD: +3.00
OD_VPRISM_DIRECTION: PROGS
OD_CYLINDER: -0.75
OD_ADD: +3.00
OD_SPHERE: -1.25
OS_ADD: +2.50
OD_SPHERE: -1.25
OD_VPRISM_DIRECTION: PROGS
OD_OVR_VA: 20/
OS_VPRISM_DIRECTION: PROGS
OS_CYLINDER: -1.50
OS_OVR_VA: 20/
OD_CYLINDER: -0.75
OS_AXIS: 003
OS_VPRISM_DIRECTION: PROGS
OS_SPHERE: -2.00
OD_AXIS: 138
OS_AXIS: 178

## 2024-02-21 ASSESSMENT — REFRACTION_AUTOREFRACTION
OS_SPHERE: -2.00
OD_CYLINDER: -0.75
OD_SPHERE: -1.25
OD_AXIS: 158
OS_AXIS: 001
OS_CYLINDER: -2.25

## 2024-02-21 ASSESSMENT — REFRACTION_MANIFEST
OS_VA2: 20/20(J1+)
OS_ADD: +3.00
OD_CYLINDER: -0.75
OD_SPHERE: -1.25
OD_AXIS: 135
OS_CYLINDER: -1.25
OD_VA1: 20/25-
OD_VA1: 20/40
OS_CYLINDER: -1.75
OD_VA2: 20/20(J1+)
OS_VA1: 20/25+2
OD_ADD: +2.50
OS_ADD: +2.50
OS_SPHERE: -2.00
OS_SPHERE: -2.00
OD_ADD: +3.00
OS_AXIS: 180
OS_AXIS: 180
OD_SPHERE: -1.50
OD_CYLINDER: -1.00
OD_AXIS: 135
OS_VA1: 20/25

## 2024-02-21 ASSESSMENT — CONFRONTATIONAL VISUAL FIELD TEST (CVF)
OD_FINDINGS: FULL
OS_FINDINGS: FULL

## 2024-02-21 ASSESSMENT — LID EXAM ASSESSMENTS
OD_MEIBOMITIS: 2+
OS_MEIBOMITIS: 2+

## 2024-06-19 NOTE — ED PROVIDER NOTE - EXTREMITY EXAM
No protocol for requested medication.    Medication: PTU  Last office visit date: 5/21/24  Pharmacy: [id number] - [place] [id number] [facility] AT [address]    Order pended, routed to clinician for review.      no deformity, pain or tenderness, no restriction of movement

## 2024-07-17 NOTE — ED PROVIDER NOTE - NSFOLLOWUPINSTRUCTIONS_ED_ALL_ED_FT
forehead follow up with your cardiologist in 2-3 days    Chest Pain    WHAT YOU NEED TO KNOW:    Chest pain can be caused by a range of conditions, from not serious to life-threatening. Chest pain can be a symptom of a digestive problem, such as acid reflux or a stomach ulcer. An anxiety attack or a strong emotion, such as anger, can also cause chest pain. Infection, inflammation, or a fracture in the bones or cartilage in your chest can cause pain or discomfort. Sometimes chest pain or pressure is caused by poor blood flow to your heart (angina). Chest pain may also be caused by life-threatening conditions such as a heart attack or blood clot in your lungs.     DISCHARGE INSTRUCTIONS:    Call 911 if:     You have any of the following signs of a heart attack:   Squeezing, pressure, or pain in your chest      You may also have any of the following:   Discomfort or pain in your back, neck, jaw, stomach, or arm      Shortness of breath      Nausea or vomiting      Lightheadedness or a sudden cold sweat        Return to the emergency department if:     You have chest discomfort that gets worse, even with medicine.      You cough or vomit blood.       Your bowel movements are black or bloody.       You cannot stop vomiting, or it hurts to swallow.     Contact your healthcare provider if:     You have questions or concerns about your condition or care.        Medicines:     Medicines may be given to treat the cause of your chest pain. Examples include pain medicine, anxiety medicine, or medicines to increase blood flow to your heart.       Do not take certain medicines without asking your healthcare provider first. These include NSAIDs, herbal or vitamin supplements, or hormones (estrogen or progestin).       Take your medicine as directed. Contact your healthcare provider if you think your medicine is not helping or if you have side effects. Tell him or her if you are allergic to any medicine. Keep a list of the medicines, vitamins, and herbs you take. Include the amounts, and when and why you take them. Bring the list or the pill bottles to follow-up visits. Carry your medicine list with you in case of an emergency.    Follow up with your healthcare provider within 72 hours, or as directed: You may need to return for more tests to find the cause of your chest pain. You may be referred to a specialist, such as a cardiologist or gastroenterologist. Write down your questions so you remember to ask them during your visits.     Healthy living tips: The following are general healthy guidelines. If your chest pain is caused by a heart problem, your healthcare provider will give you specific guidelines to follow.    Do not smoke. Nicotine and other chemicals in cigarettes and cigars can cause lung and heart damage. Ask your healthcare provider for information if you currently smoke and need help to quit. E-cigarettes or smokeless tobacco still contain nicotine. Talk to your healthcare provider before you use these products.       Eat a variety of healthy, low-fat, low-salt foods. Healthy foods include fruits, vegetables, whole-grain breads, low-fat dairy products, beans, lean meats, and fish. Ask for more information about a heart healthy diet.      Drink plenty of water every day. Your body is made of mostly water. Water helps your body to control your temperature and blood pressure. Ask your healthcare provider how much water you should drink every day.      Ask about activity. Your healthcare provider will tell you which activities to limit or avoid. Ask when you can drive, return to work, and have sex. Ask about the best exercise plan for you.      Maintain a healthy weight. Ask your healthcare provider how much you should weigh. Ask him or her to help you create a weight loss plan if you are overweight.       Get the flu and pneumonia vaccines. All adults should get the influenza (flu) vaccine. Get it every year as soon as it becomes available. The pneumococcal vaccine is given to adults aged 65 years or older. The vaccine is given every 5 years to prevent pneumococcal disease, such as pneumonia.    If you have a stent:     Carry your stent card with you at all times.       Let all healthcare providers know that you have a stent.          © Copyright [employer] 2019 All illustrations and images included in CareNotes are the copyrighted property of [employer] or [employer].      back to top                      © Copyright [employer] 2019

## 2024-12-03 ENCOUNTER — NON-APPOINTMENT (OUTPATIENT)
Age: 69
End: 2024-12-03

## 2024-12-03 NOTE — ADDENDUM
PENDING : *Rubella *Tb read - 12/5 @ 069am [FreeTextEntry1] : This note was written by Roxie Silveira on 05/24/2021 acting as scribe for Yu Best M.D.\par \par I, Dr. Yu Fox, personally performed the evaluation and management (E/M) services for this established patient who presents today with (an) existing condition(s).  That E/M includes conducting the examination, assessing all conditions, and (re)establishing/reinforcing a plan of care.  Today, my ACP, Carmelina FONTANA, was here to observe my evaluation and management services for this condition to be followed going forward.\par \par \par